# Patient Record
Sex: MALE | Race: OTHER | NOT HISPANIC OR LATINO
[De-identification: names, ages, dates, MRNs, and addresses within clinical notes are randomized per-mention and may not be internally consistent; named-entity substitution may affect disease eponyms.]

---

## 2017-01-12 ENCOUNTER — APPOINTMENT (OUTPATIENT)
Dept: PEDIATRIC NEPHROLOGY | Facility: CLINIC | Age: 22
End: 2017-01-12

## 2017-01-12 ENCOUNTER — OTHER (OUTPATIENT)
Age: 22
End: 2017-01-12

## 2017-01-12 VITALS
HEART RATE: 69 BPM | WEIGHT: 170.09 LBS | DIASTOLIC BLOOD PRESSURE: 53 MMHG | BODY MASS INDEX: 33.39 KG/M2 | HEIGHT: 60 IN | SYSTOLIC BLOOD PRESSURE: 105 MMHG

## 2017-01-12 DIAGNOSIS — Q60.0 RENAL AGENESIS, UNILATERAL: ICD-10-CM

## 2017-01-12 DIAGNOSIS — R80.9 PROTEINURIA, UNSPECIFIED: ICD-10-CM

## 2017-01-12 DIAGNOSIS — R63.1 POLYDIPSIA: ICD-10-CM

## 2017-01-23 PROBLEM — R80.9 PROTEINURIA: Status: ACTIVE | Noted: 2017-01-23

## 2017-01-24 ENCOUNTER — MESSAGE (OUTPATIENT)
Age: 22
End: 2017-01-24

## 2017-01-24 LAB
ANION GAP SERPL CALC-SCNC: 16 MMOL/L
BASOPHILS # BLD AUTO: 0.01 K/UL
BASOPHILS NFR BLD AUTO: 0.1 %
BUN SERPL-MCNC: 13 MG/DL
CALCIUM SERPL-MCNC: 10.1 MG/DL
CHLORIDE SERPL-SCNC: 106 MMOL/L
CO2 SERPL-SCNC: 23 MMOL/L
CREAT SERPL-MCNC: 0.74 MG/DL
CREAT SPEC-SCNC: 227 MG/DL
CREAT/PROT UR: 0.3 RATIO
EOSINOPHIL # BLD AUTO: 0.2 K/UL
EOSINOPHIL NFR BLD AUTO: 2.6 %
GLUCOSE SERPL-MCNC: 96 MG/DL
HCT VFR BLD CALC: 44.3 %
HGB BLD-MCNC: 15.3 G/DL
IMM GRANULOCYTES NFR BLD AUTO: 0.3 %
INR PPP: 3.83 RATIO
LYMPHOCYTES # BLD AUTO: 2.07 K/UL
LYMPHOCYTES NFR BLD AUTO: 27.1 %
MAN DIFF?: NORMAL
MCHC RBC-ENTMCNC: 29 PG
MCHC RBC-ENTMCNC: 34.5 GM/DL
MCV RBC AUTO: 83.9 FL
MONOCYTES # BLD AUTO: 0.74 K/UL
MONOCYTES NFR BLD AUTO: 9.7 %
NEUTROPHILS # BLD AUTO: 4.6 K/UL
NEUTROPHILS NFR BLD AUTO: 60.2 %
OSMOLALITY SERPL: 299 MOS/KG
OSMOLALITY UR: 1085 MOS/KG
PHOSPHATE SERPL-MCNC: 3.2 MG/DL
PLATELET # BLD AUTO: 193 K/UL
POTASSIUM SERPL-SCNC: 4.2 MMOL/L
PROT UR-MCNC: 59 MG/DL
PT BLD: 43.9 SEC
RBC # BLD: 5.28 M/UL
RBC # FLD: 13.5 %
SODIUM ?TM SUB UR QN: 173 MMOL/L
SODIUM SERPL-SCNC: 145 MMOL/L
WBC # FLD AUTO: 7.64 K/UL

## 2017-01-25 ENCOUNTER — MESSAGE (OUTPATIENT)
Age: 22
End: 2017-01-25

## 2017-02-13 ENCOUNTER — APPOINTMENT (OUTPATIENT)
Dept: OTOLARYNGOLOGY | Facility: CLINIC | Age: 22
End: 2017-02-13

## 2017-02-13 ENCOUNTER — RECORD ABSTRACTING (OUTPATIENT)
Age: 22
End: 2017-02-13

## 2017-02-13 DIAGNOSIS — K13.79 OTHER LESIONS OF ORAL MUCOSA: ICD-10-CM

## 2017-02-15 LAB
INR PPP: 3.59 RATIO
PT BLD: 41.1 SEC

## 2017-02-27 ENCOUNTER — APPOINTMENT (OUTPATIENT)
Dept: PHARMACY | Facility: CLINIC | Age: 22
End: 2017-02-27

## 2017-03-01 ENCOUNTER — OUTPATIENT (OUTPATIENT)
Dept: OUTPATIENT SERVICES | Age: 22
LOS: 1 days | Discharge: HOME | End: 2017-03-01

## 2017-03-01 DIAGNOSIS — Q24.9 CONGENITAL MALFORMATION OF HEART, UNSPECIFIED: Chronic | ICD-10-CM

## 2017-03-01 DIAGNOSIS — Z98.89 OTHER SPECIFIED POSTPROCEDURAL STATES: Chronic | ICD-10-CM

## 2017-03-01 DIAGNOSIS — Z95.4 PRESENCE OF OTHER HEART-VALVE REPLACEMENT: Chronic | ICD-10-CM

## 2017-03-02 ENCOUNTER — APPOINTMENT (OUTPATIENT)
Dept: PEDIATRIC CARDIOLOGY | Facility: CLINIC | Age: 22
End: 2017-03-02

## 2017-03-02 VITALS
HEART RATE: 67 BPM | DIASTOLIC BLOOD PRESSURE: 60 MMHG | WEIGHT: 166.67 LBS | OXYGEN SATURATION: 99 % | BODY MASS INDEX: 32.3 KG/M2 | HEIGHT: 60.24 IN | SYSTOLIC BLOOD PRESSURE: 108 MMHG

## 2017-03-13 ENCOUNTER — LABORATORY RESULT (OUTPATIENT)
Age: 22
End: 2017-03-13

## 2017-03-27 ENCOUNTER — LABORATORY RESULT (OUTPATIENT)
Age: 22
End: 2017-03-27

## 2017-03-27 ENCOUNTER — OTHER (OUTPATIENT)
Age: 22
End: 2017-03-27

## 2017-04-24 ENCOUNTER — OTHER (OUTPATIENT)
Age: 22
End: 2017-04-24

## 2017-04-24 ENCOUNTER — APPOINTMENT (OUTPATIENT)
Dept: OTOLARYNGOLOGY | Facility: CLINIC | Age: 22
End: 2017-04-24

## 2017-04-24 VITALS
DIASTOLIC BLOOD PRESSURE: 82 MMHG | WEIGHT: 166 LBS | HEART RATE: 70 BPM | SYSTOLIC BLOOD PRESSURE: 131 MMHG | BODY MASS INDEX: 32.59 KG/M2 | HEIGHT: 60 IN

## 2017-04-26 LAB
INR PPP: 2.94 RATIO
PT BLD: 34 SEC

## 2017-05-22 ENCOUNTER — LABORATORY RESULT (OUTPATIENT)
Age: 22
End: 2017-05-22

## 2017-06-26 ENCOUNTER — LABORATORY RESULT (OUTPATIENT)
Age: 22
End: 2017-06-26

## 2017-07-24 ENCOUNTER — APPOINTMENT (OUTPATIENT)
Dept: OTOLARYNGOLOGY | Facility: CLINIC | Age: 22
End: 2017-07-24

## 2017-07-24 ENCOUNTER — OTHER (OUTPATIENT)
Age: 22
End: 2017-07-24

## 2017-07-24 ENCOUNTER — LABORATORY RESULT (OUTPATIENT)
Age: 22
End: 2017-07-24

## 2017-08-08 ENCOUNTER — OUTPATIENT (OUTPATIENT)
Dept: OUTPATIENT SERVICES | Age: 22
LOS: 1 days | End: 2017-08-08

## 2017-08-08 DIAGNOSIS — Q24.9 CONGENITAL MALFORMATION OF HEART, UNSPECIFIED: Chronic | ICD-10-CM

## 2017-08-08 DIAGNOSIS — Z95.4 PRESENCE OF OTHER HEART-VALVE REPLACEMENT: Chronic | ICD-10-CM

## 2017-08-08 DIAGNOSIS — Z98.89 OTHER SPECIFIED POSTPROCEDURAL STATES: Chronic | ICD-10-CM

## 2017-08-08 DIAGNOSIS — Z01.20 ENCOUNTER FOR DENTAL EXAMINATION AND CLEANING WITHOUT ABNORMAL FINDINGS: ICD-10-CM

## 2017-09-11 ENCOUNTER — LABORATORY RESULT (OUTPATIENT)
Age: 22
End: 2017-09-11

## 2017-09-11 ENCOUNTER — APPOINTMENT (OUTPATIENT)
Dept: PEDIATRIC ALLERGY IMMUNOLOGY | Facility: CLINIC | Age: 22
End: 2017-09-11
Payer: COMMERCIAL

## 2017-09-11 ENCOUNTER — OUTPATIENT (OUTPATIENT)
Dept: OUTPATIENT SERVICES | Age: 22
LOS: 1 days | Discharge: HOME | End: 2017-09-11

## 2017-09-11 VITALS
HEIGHT: 60 IN | SYSTOLIC BLOOD PRESSURE: 115 MMHG | BODY MASS INDEX: 32 KG/M2 | HEART RATE: 65 BPM | WEIGHT: 162.99 LBS | DIASTOLIC BLOOD PRESSURE: 74 MMHG | OXYGEN SATURATION: 98 %

## 2017-09-11 DIAGNOSIS — Z13.228 ENCOUNTER FOR SCREENING FOR OTHER SUSPECTED ENDOCRINE DISORDER: ICD-10-CM

## 2017-09-11 DIAGNOSIS — Q24.9 CONGENITAL MALFORMATION OF HEART, UNSPECIFIED: Chronic | ICD-10-CM

## 2017-09-11 DIAGNOSIS — Z86.19 PERSONAL HISTORY OF OTHER INFECTIOUS AND PARASITIC DISEASES: ICD-10-CM

## 2017-09-11 DIAGNOSIS — Z13.29 ENCOUNTER FOR SCREENING FOR OTHER SUSPECTED ENDOCRINE DISORDER: ICD-10-CM

## 2017-09-11 DIAGNOSIS — J31.0 CHRONIC RHINITIS: ICD-10-CM

## 2017-09-11 DIAGNOSIS — Z95.4 PRESENCE OF OTHER HEART-VALVE REPLACEMENT: Chronic | ICD-10-CM

## 2017-09-11 DIAGNOSIS — Z98.89 OTHER SPECIFIED POSTPROCEDURAL STATES: Chronic | ICD-10-CM

## 2017-09-11 DIAGNOSIS — Z13.0 ENCOUNTER FOR SCREENING FOR OTHER SUSPECTED ENDOCRINE DISORDER: ICD-10-CM

## 2017-09-11 PROCEDURE — 36415 COLL VENOUS BLD VENIPUNCTURE: CPT | Mod: GC

## 2017-09-11 PROCEDURE — 99205 OFFICE O/P NEW HI 60 MIN: CPT | Mod: 25,GC

## 2017-09-11 PROCEDURE — 95004 PERQ TESTS W/ALRGNC XTRCS: CPT | Mod: GC

## 2017-09-12 ENCOUNTER — LABORATORY RESULT (OUTPATIENT)
Age: 22
End: 2017-09-12

## 2017-09-12 ENCOUNTER — APPOINTMENT (OUTPATIENT)
Dept: PEDIATRIC CARDIOLOGY | Facility: CLINIC | Age: 22
End: 2017-09-12
Payer: COMMERCIAL

## 2017-09-12 VITALS
WEIGHT: 163.14 LBS | HEIGHT: 60 IN | BODY MASS INDEX: 32.03 KG/M2 | DIASTOLIC BLOOD PRESSURE: 60 MMHG | SYSTOLIC BLOOD PRESSURE: 112 MMHG | HEART RATE: 87 BPM | OXYGEN SATURATION: 98 %

## 2017-09-12 LAB
ALBUMIN SERPL ELPH-MCNC: 4.8 G/DL
ALP BLD-CCNC: 136 U/L
ALT SERPL-CCNC: 23 U/L
ANION GAP SERPL CALC-SCNC: 21 MMOL/L
AST SERPL-CCNC: 26 U/L
BASOPHILS # BLD AUTO: 0.01 K/UL
BASOPHILS NFR BLD AUTO: 0.1 %
BILIRUB SERPL-MCNC: 0.8 MG/DL
BUN SERPL-MCNC: 20 MG/DL
CALCIUM SERPL-MCNC: 11 MG/DL
CD16+CD56+ CELLS # BLD: 215 /UL
CD16+CD56+ CELLS NFR BLD: 10 %
CD19 CELLS NFR BLD: 388 /UL
CD3 CELLS # BLD: 1612 /UL
CD3 CELLS NFR BLD: 72 %
CD3+CD4+ CELLS # BLD: 715 /UL
CD3+CD4+ CELLS NFR BLD: 31 %
CD3+CD4+ CELLS/CD3+CD8+ CLL SPEC: 0.87 RATIO
CD3+CD8+ CELLS # SPEC: 825 /UL
CD3+CD8+ CELLS NFR BLD: 36 %
CELLS.CD3-CD19+/CELLS IN BLOOD: 18 %
CH50 SERPL-MCNC: 58 U/ML
CHLORIDE SERPL-SCNC: 105 MMOL/L
CO2 SERPL-SCNC: 19 MMOL/L
CREAT SERPL-MCNC: 0.71 MG/DL
EOSINOPHIL # BLD AUTO: 0.19 K/UL
EOSINOPHIL NFR BLD AUTO: 2.5 %
GLUCOSE SERPL-MCNC: 75 MG/DL
HBV SURFACE AB SER QL: NONREACTIVE
HCT VFR BLD CALC: 44.4 %
HGB BLD-MCNC: 15.5 G/DL
IMM GRANULOCYTES NFR BLD AUTO: 0.3 %
LYMPHOCYTES # BLD AUTO: 2.14 K/UL
LYMPHOCYTES NFR BLD AUTO: 28.3 %
MAN DIFF?: NORMAL
MCHC RBC-ENTMCNC: 29.1 PG
MCHC RBC-ENTMCNC: 34.9 GM/DL
MCV RBC AUTO: 83.5 FL
MONOCYTES # BLD AUTO: 0.64 K/UL
MONOCYTES NFR BLD AUTO: 8.5 %
NEUTROPHILS # BLD AUTO: 4.57 K/UL
NEUTROPHILS NFR BLD AUTO: 60.3 %
PLATELET # BLD AUTO: 191 K/UL
POTASSIUM SERPL-SCNC: 4.5 MMOL/L
PROT SERPL-MCNC: 7.5 G/DL
RBC # BLD: 5.32 M/UL
RBC # FLD: 13.5 %
SODIUM SERPL-SCNC: 145 MMOL/L
WBC # FLD AUTO: 7.57 K/UL

## 2017-09-12 PROCEDURE — 99214 OFFICE O/P EST MOD 30 MIN: CPT | Mod: 25

## 2017-09-12 PROCEDURE — 93000 ELECTROCARDIOGRAM COMPLETE: CPT

## 2017-09-13 LAB
A ALTERNATA IGE QN: <0.1 KUA/L
A FUMIGATUS IGE QN: <0.1 KUA/L
AMER BEECH IGE QN: 0
BOXELDER IGE QN: <0.1 KUA/L
C HERBARUM IGE QN: <0.1 KUA/L
C LUNATA IGE QN: <0.1 KUA/L
C TETANI IGG SER-ACNC: 1.53 IU/ML
CAT DANDER IGE QN: <0.1 KUA/L
CMN PIGWEED IGE QN: <0.1 KUA/L
COCKLEBUR IGE QN: <0.1 KUA/L
COCKSFOOT IGE QN: <0.1 KUA/L
COMMON RAGWEED IGE QN: <0.1 KUA/L
COTTONWOOD IGE QN: <0.1 KUA/L
D FARINAE IGE QN: <0.1 KUA/L
D PTERONYSS IGE QN: <0.1 KUA/L
DEPRECATED A ALTERNATA IGE RAST QL: 0
DEPRECATED A FUMIGATUS IGE RAST QL: 0
DEPRECATED A PULLULANS IGE RAST QL: 0
DEPRECATED AMER BEECH IGE RAST QL: <0.1 KUA/L
DEPRECATED BOXELDER IGE RAST QL: 0
DEPRECATED C HERBARUM IGE RAST QL: 0
DEPRECATED C LUNATA IGE RAST QL: 0
DEPRECATED CAT DANDER IGE RAST QL: 0
DEPRECATED COCKLEBUR IGE RAST QL: 0
DEPRECATED COCKSFOOT IGE RAST QL: 0
DEPRECATED COMMON PIGWEED IGE RAST QL: 0
DEPRECATED COMMON RAGWEED IGE RAST QL: 0
DEPRECATED COTTONWOOD IGE RAST QL: 0
DEPRECATED D FARINAE IGE RAST QL: 0
DEPRECATED D PTERONYSS IGE RAST QL: 0
DEPRECATED DOG DANDER IGE RAST QL: 0
DEPRECATED ENGL PLANTAIN IGE RAST QL: 0
DEPRECATED F MONILIFORME IGE RAST QL: 0
DEPRECATED GIANT RAGWEED IGE RAST QL: 0
DEPRECATED GOOSE FEATHER IGE RAST QL: 0
DEPRECATED GOOSEFOOT IGE RAST QL: 0
DEPRECATED KAPPA LC FREE/LAMBDA SER: 1.29 RATIO
DEPRECATED KENT BLUE GRASS IGE RAST QL: 0
DEPRECATED LONDON PLANE IGE RAST QL: 0
DEPRECATED M RACEMOSUS IGE RAST QL: 0
DEPRECATED MUGWORT IGE RAST QL: 0
DEPRECATED P NOTATUM IGE RAST QL: 0
DEPRECATED R NIGRICANS IGE RAST QL: 0
DEPRECATED RED CEDAR IGE RAST QL: 0
DEPRECATED RED TOP GRASS IGE RAST QL: 0
DEPRECATED ROACH IGE RAST QL: 0
DEPRECATED RYE IGE RAST QL: 0
DEPRECATED SALTWORT IGE RAST QL: 0
DEPRECATED SILVER BIRCH IGE RAST QL: 0
DEPRECATED TIMOTHY IGE RAST QL: 0
DEPRECATED WHITE ASH IGE RAST QL: 0
DEPRECATED WHITE HICKORY IGE RAST QL: 0
DEPRECATED WHITE OAK IGE RAST QL: 0
DOG DANDER IGE QN: <0.1 KUA/L
ENGL PLANTAIN IGE QN: <0.1 KUA/L
F MONILIFORME IGE QN: <0.1 KUA/L
GIANT RAGWEED IGE QN: <0.1 KUA/L
GOOSE FEATHER IGE QN: <0.1 KUA/L
GOOSEFOOT IGE QN: <0.1 KUA/L
GRAY ALDER (T2) CLASS: 0
GRAY ALDER (T2) CONC: <0.1 KUA/L
IGA SER QL IEP: 57 MG/DL
IGG SER QL IEP: 887 MG/DL
IGM SER QL IEP: 63 MG/DL
KAPPA LC CSF-MCNC: 1.27 MG/DL
KAPPA LC SERPL-MCNC: 1.64 MG/DL
KENT BLUE GRASS IGE QN: <0.1 KUA/L
LONDON PLANE IGE QN: <0.1 KUA/L
M RACEMOSUS IGE QN: <0.1 KUA/L
MEV IGG FLD QL IA: 45.9 AU/ML
MEV IGG+IGM SER-IMP: POSITIVE
MOLD (AUREOBASIDIUM M12) CONC: <0.1 KUA/L
MUGWORT IGE QN: <0.1 KUA/L
MULBERRY (T70) CLASS: 0
MULBERRY (T70) CONC: <0.1 KUA/L
P NOTATUM IGE QN: <0.1 KUA/L
R NIGRICANS IGE QN: <0.1 KUA/L
RED CEDAR IGE QN: <0.1 KUA/L
RED TOP GRASS IGE QN: <0.1 KUA/L
ROACH IGE QN: <0.1 KUA/L
RYE IGE QN: <0.1 KUA/L
SALTWORT IGE QN: <0.1 KUA/L
SILVER BIRCH IGE QN: <0.1 KUA/L
TIMOTHY IGE QN: <0.1 KUA/L
WHITE ASH IGE QN: <0.1 KUA/L
WHITE ELM IGE QN: 0
WHITE ELM IGE QN: <0.1 KUA/L
WHITE HICKORY IGE QN: <0.1 KUA/L
WHITE OAK IGE QN: <0.1 KUA/L

## 2017-09-18 LAB
COMPLEMENT, ALTERNATE PATHWAY (AH50): 74
DEPRECATED S PNEUM 1 IGG SER-MCNC: 4.8 MCG/ML
DEPRECATED S PNEUM12 AB SER-ACNC: 0.6 MCG/ML
DEPRECATED S PNEUM14 AB SER-ACNC: 1.9 MCG/ML
DEPRECATED S PNEUM17 IGG SER IA-MCNC: 8.2 MCG/ML
DEPRECATED S PNEUM18 IGG SER IA-MCNC: 0.4 MCG/ML
DEPRECATED S PNEUM19 IGG SER-MCNC: 13.7 MCG/ML
DEPRECATED S PNEUM19 IGG SER-MCNC: 4.4 MCG/ML
DEPRECATED S PNEUM2 IGG SER-MCNC: 0.6 MCG/ML
DEPRECATED S PNEUM20 IGG SER-MCNC: 1.6 MCG/ML
DEPRECATED S PNEUM22 IGG SER-MCNC: 10 MCG/ML
DEPRECATED S PNEUM23 AB SER-ACNC: 20.6 MCG/ML
DEPRECATED S PNEUM3 AB SER-ACNC: 6.5 MCG/ML
DEPRECATED S PNEUM34 IGG SER-MCNC: 28.7 MCG/ML
DEPRECATED S PNEUM4 AB SER-ACNC: 1.7 MCG/ML
DEPRECATED S PNEUM5 IGG SER-MCNC: 1.7 MCG/ML
DEPRECATED S PNEUM6 IGG SER-MCNC: 5.3 MCG/ML
DEPRECATED S PNEUM7 IGG SER-ACNC: 5.2 MCG/ML
DEPRECATED S PNEUM8 AB SER-ACNC: 6.4 MCG/ML
DEPRECATED S PNEUM9 AB SER-ACNC: 9.2 MCG/ML
DEPRECATED S PNEUM9 IGG SER-MCNC: 7.1 MCG/ML
HAEM INFLU B AB SER-MCNC: 4.86 MG/L
LPT PW BLD-NRATE: NORMAL
STREPTOCOCCUS PNEUMONIAE SEROTYPE 11A: 0.7 MCG/ML
STREPTOCOCCUS PNEUMONIAE SEROTYPE 15B: 3.3 MCG/ML
STREPTOCOCCUS PNEUMONIAE SEROTYPE 33F: 5.2 MCG/ML

## 2017-09-19 LAB — MANNAN BINDING LECTIN (MBL): 2410 NG/ML

## 2017-10-04 ENCOUNTER — MEDICATION RENEWAL (OUTPATIENT)
Age: 22
End: 2017-10-04

## 2017-10-05 ENCOUNTER — MEDICATION RENEWAL (OUTPATIENT)
Age: 22
End: 2017-10-05

## 2017-10-11 ENCOUNTER — APPOINTMENT (OUTPATIENT)
Dept: PEDIATRIC CARDIOLOGY | Facility: CLINIC | Age: 22
End: 2017-10-11
Payer: COMMERCIAL

## 2017-10-11 VITALS
OXYGEN SATURATION: 98 % | HEART RATE: 64 BPM | WEIGHT: 160.94 LBS | HEIGHT: 60 IN | SYSTOLIC BLOOD PRESSURE: 114 MMHG | BODY MASS INDEX: 31.6 KG/M2 | DIASTOLIC BLOOD PRESSURE: 61 MMHG

## 2017-10-11 PROCEDURE — 93000 ELECTROCARDIOGRAM COMPLETE: CPT | Mod: GC

## 2017-10-11 PROCEDURE — 93303 ECHO TRANSTHORACIC: CPT

## 2017-10-11 PROCEDURE — 93325 DOPPLER ECHO COLOR FLOW MAPG: CPT

## 2017-10-11 PROCEDURE — 93320 DOPPLER ECHO COMPLETE: CPT

## 2017-10-11 PROCEDURE — 99215 OFFICE O/P EST HI 40 MIN: CPT | Mod: 25,GC

## 2017-10-30 ENCOUNTER — APPOINTMENT (OUTPATIENT)
Dept: OTOLARYNGOLOGY | Facility: CLINIC | Age: 22
End: 2017-10-30
Payer: COMMERCIAL

## 2017-10-30 VITALS
HEIGHT: 60 IN | SYSTOLIC BLOOD PRESSURE: 156 MMHG | WEIGHT: 165 LBS | DIASTOLIC BLOOD PRESSURE: 81 MMHG | BODY MASS INDEX: 32.39 KG/M2

## 2017-10-30 DIAGNOSIS — F80.4 SPEECH AND LANGUAGE DEVELOPMENT DELAY DUE TO HEARING LOSS: ICD-10-CM

## 2017-10-30 PROCEDURE — 99213 OFFICE O/P EST LOW 20 MIN: CPT

## 2017-10-30 RX ORDER — AMOXICILLIN AND CLAVULANATE POTASSIUM 875; 125 MG/1; MG/1
875-125 TABLET, COATED ORAL
Refills: 0 | Status: DISCONTINUED | COMMUNITY
End: 2017-10-30

## 2017-11-13 ENCOUNTER — LABORATORY RESULT (OUTPATIENT)
Age: 22
End: 2017-11-13

## 2017-11-13 ENCOUNTER — MEDICATION RENEWAL (OUTPATIENT)
Age: 22
End: 2017-11-13

## 2017-12-18 ENCOUNTER — LABORATORY RESULT (OUTPATIENT)
Age: 22
End: 2017-12-18

## 2018-01-24 ENCOUNTER — APPOINTMENT (OUTPATIENT)
Dept: OTOLARYNGOLOGY | Facility: CLINIC | Age: 23
End: 2018-01-24
Payer: COMMERCIAL

## 2018-01-24 VITALS — DIASTOLIC BLOOD PRESSURE: 70 MMHG | SYSTOLIC BLOOD PRESSURE: 116 MMHG | WEIGHT: 165 LBS | HEART RATE: 65 BPM

## 2018-01-24 PROCEDURE — 99213 OFFICE O/P EST LOW 20 MIN: CPT

## 2018-01-25 LAB
INR PPP: 2.21 RATIO
PT BLD: 25.4 SEC

## 2018-02-12 ENCOUNTER — APPOINTMENT (OUTPATIENT)
Dept: OTOLARYNGOLOGY | Facility: CLINIC | Age: 23
End: 2018-02-12
Payer: COMMERCIAL

## 2018-02-12 VITALS — WEIGHT: 165 LBS

## 2018-02-12 PROCEDURE — 92567 TYMPANOMETRY: CPT

## 2018-02-12 PROCEDURE — 99213 OFFICE O/P EST LOW 20 MIN: CPT | Mod: 25

## 2018-02-12 PROCEDURE — 92582 CONDITIONING PLAY AUDIOMETRY: CPT

## 2018-02-20 ENCOUNTER — LABORATORY RESULT (OUTPATIENT)
Age: 23
End: 2018-02-20

## 2018-02-20 ENCOUNTER — OTHER (OUTPATIENT)
Age: 23
End: 2018-02-20

## 2018-02-26 ENCOUNTER — APPOINTMENT (OUTPATIENT)
Dept: OTOLARYNGOLOGY | Facility: CLINIC | Age: 23
End: 2018-02-26

## 2018-02-26 ENCOUNTER — APPOINTMENT (OUTPATIENT)
Dept: PEDIATRIC ALLERGY IMMUNOLOGY | Facility: CLINIC | Age: 23
End: 2018-02-26

## 2018-03-07 RX ORDER — AMOXICILLIN AND CLAVULANATE POTASSIUM 875; 125 MG/1; MG/1
875-125 TABLET, COATED ORAL
Qty: 20 | Refills: 0 | Status: COMPLETED | COMMUNITY
Start: 2018-01-24 | End: 2018-03-07

## 2018-03-21 ENCOUNTER — OTHER (OUTPATIENT)
Age: 23
End: 2018-03-21

## 2018-03-22 ENCOUNTER — MEDICATION RENEWAL (OUTPATIENT)
Age: 23
End: 2018-03-22

## 2018-03-22 LAB
INR PPP: 2.96 RATIO
PT BLD: 34.2 SEC

## 2018-04-16 ENCOUNTER — OTHER (OUTPATIENT)
Age: 23
End: 2018-04-16

## 2018-04-17 LAB
INR PPP: 2.31 RATIO
PT BLD: 26.6 SEC

## 2018-05-14 ENCOUNTER — APPOINTMENT (OUTPATIENT)
Dept: OTOLARYNGOLOGY | Facility: CLINIC | Age: 23
End: 2018-05-14
Payer: COMMERCIAL

## 2018-05-14 VITALS — WEIGHT: 165 LBS | BODY MASS INDEX: 32.39 KG/M2 | HEIGHT: 60 IN

## 2018-05-14 DIAGNOSIS — H66.92 OTITIS MEDIA, UNSPECIFIED, LEFT EAR: ICD-10-CM

## 2018-05-14 LAB
INR PPP: 3.45 RATIO
PT BLD: 40 SEC

## 2018-05-14 PROCEDURE — 99213 OFFICE O/P EST LOW 20 MIN: CPT

## 2018-05-25 RX ORDER — CIPROFLOXACIN AND DEXAMETHASONE 3; 1 MG/ML; MG/ML
0.3-0.1 SUSPENSION/ DROPS AURICULAR (OTIC)
Qty: 1 | Refills: 4 | Status: COMPLETED | COMMUNITY
Start: 2018-01-24 | End: 2018-05-25

## 2018-06-21 LAB
INR PPP: 2.87 RATIO
PT BLD: 33.1 SEC

## 2018-07-17 ENCOUNTER — OUTPATIENT (OUTPATIENT)
Dept: OUTPATIENT SERVICES | Age: 23
LOS: 1 days | End: 2018-07-17

## 2018-07-17 ENCOUNTER — OTHER (OUTPATIENT)
Age: 23
End: 2018-07-17

## 2018-07-17 DIAGNOSIS — Z95.4 PRESENCE OF OTHER HEART-VALVE REPLACEMENT: Chronic | ICD-10-CM

## 2018-07-17 DIAGNOSIS — Z98.89 OTHER SPECIFIED POSTPROCEDURAL STATES: Chronic | ICD-10-CM

## 2018-07-17 DIAGNOSIS — Q24.9 CONGENITAL MALFORMATION OF HEART, UNSPECIFIED: Chronic | ICD-10-CM

## 2018-07-18 DIAGNOSIS — Z01.20 ENCOUNTER FOR DENTAL EXAMINATION AND CLEANING WITHOUT ABNORMAL FINDINGS: ICD-10-CM

## 2018-07-19 LAB
INR PPP: 2.65 RATIO
PT BLD: 30.6 SEC

## 2018-08-13 ENCOUNTER — APPOINTMENT (OUTPATIENT)
Dept: OTOLARYNGOLOGY | Facility: CLINIC | Age: 23
End: 2018-08-13
Payer: COMMERCIAL

## 2018-08-13 VITALS — WEIGHT: 167 LBS | BODY MASS INDEX: 32.79 KG/M2 | HEIGHT: 60 IN

## 2018-08-13 PROCEDURE — 69210 REMOVE IMPACTED EAR WAX UNI: CPT

## 2018-08-13 PROCEDURE — 99213 OFFICE O/P EST LOW 20 MIN: CPT | Mod: 25

## 2018-08-20 ENCOUNTER — OTHER (OUTPATIENT)
Age: 23
End: 2018-08-20

## 2018-08-24 LAB
INR PPP: 2.16 RATIO
PT BLD: 24.8 SEC

## 2018-09-17 ENCOUNTER — OUTPATIENT (OUTPATIENT)
Dept: OUTPATIENT SERVICES | Age: 23
LOS: 1 days | Discharge: HOME | End: 2018-09-17

## 2018-09-17 DIAGNOSIS — Z95.4 PRESENCE OF OTHER HEART-VALVE REPLACEMENT: Chronic | ICD-10-CM

## 2018-09-17 DIAGNOSIS — Z98.89 OTHER SPECIFIED POSTPROCEDURAL STATES: Chronic | ICD-10-CM

## 2018-09-17 DIAGNOSIS — Q24.9 CONGENITAL MALFORMATION OF HEART, UNSPECIFIED: Chronic | ICD-10-CM

## 2018-09-18 ENCOUNTER — APPOINTMENT (OUTPATIENT)
Dept: PEDIATRIC CARDIOLOGY | Facility: CLINIC | Age: 23
End: 2018-09-18
Payer: COMMERCIAL

## 2018-09-18 ENCOUNTER — OTHER (OUTPATIENT)
Age: 23
End: 2018-09-18

## 2018-09-18 VITALS
WEIGHT: 163.14 LBS | BODY MASS INDEX: 32.03 KG/M2 | HEIGHT: 60 IN | OXYGEN SATURATION: 98 % | SYSTOLIC BLOOD PRESSURE: 112 MMHG | HEART RATE: 70 BPM | DIASTOLIC BLOOD PRESSURE: 62 MMHG

## 2018-09-18 VITALS
BODY MASS INDEX: 32.03 KG/M2 | HEART RATE: 70 BPM | HEIGHT: 60 IN | OXYGEN SATURATION: 98 % | DIASTOLIC BLOOD PRESSURE: 52 MMHG | WEIGHT: 163.14 LBS | SYSTOLIC BLOOD PRESSURE: 112 MMHG

## 2018-09-18 PROCEDURE — 93320 DOPPLER ECHO COMPLETE: CPT

## 2018-09-18 PROCEDURE — 99215 OFFICE O/P EST HI 40 MIN: CPT | Mod: 25

## 2018-09-18 PROCEDURE — 93000 ELECTROCARDIOGRAM COMPLETE: CPT

## 2018-09-18 PROCEDURE — 93325 DOPPLER ECHO COLOR FLOW MAPG: CPT

## 2018-09-18 PROCEDURE — 93303 ECHO TRANSTHORACIC: CPT

## 2018-09-18 PROCEDURE — 99213 OFFICE O/P EST LOW 20 MIN: CPT | Mod: 25

## 2018-09-20 LAB
INR PPP: 2.65 RATIO
PT BLD: 30.5 SEC

## 2018-10-23 LAB
INR PPP: 2.5 RATIO
PT BLD: 28.8 SEC

## 2018-11-05 ENCOUNTER — APPOINTMENT (OUTPATIENT)
Dept: OTOLARYNGOLOGY | Facility: CLINIC | Age: 23
End: 2018-11-05
Payer: COMMERCIAL

## 2018-11-05 VITALS
HEART RATE: 76 BPM | WEIGHT: 163 LBS | SYSTOLIC BLOOD PRESSURE: 120 MMHG | DIASTOLIC BLOOD PRESSURE: 64 MMHG | HEIGHT: 60 IN | BODY MASS INDEX: 32 KG/M2

## 2018-11-05 PROCEDURE — 69210 REMOVE IMPACTED EAR WAX UNI: CPT

## 2018-11-05 PROCEDURE — 99213 OFFICE O/P EST LOW 20 MIN: CPT | Mod: 25

## 2018-11-17 ENCOUNTER — RESULT REVIEW (OUTPATIENT)
Age: 23
End: 2018-11-17

## 2018-11-20 LAB
INR PPP: 2.86
PT BLD: 33.7

## 2018-11-26 ENCOUNTER — OTHER (OUTPATIENT)
Age: 23
End: 2018-11-26

## 2018-12-17 ENCOUNTER — APPOINTMENT (OUTPATIENT)
Dept: OTOLARYNGOLOGY | Facility: CLINIC | Age: 23
End: 2018-12-17
Payer: COMMERCIAL

## 2018-12-17 VITALS
SYSTOLIC BLOOD PRESSURE: 113 MMHG | HEIGHT: 60 IN | HEART RATE: 71 BPM | WEIGHT: 163 LBS | DIASTOLIC BLOOD PRESSURE: 74 MMHG | BODY MASS INDEX: 32 KG/M2

## 2018-12-17 PROCEDURE — 99213 OFFICE O/P EST LOW 20 MIN: CPT | Mod: 25

## 2018-12-17 PROCEDURE — 69210 REMOVE IMPACTED EAR WAX UNI: CPT

## 2018-12-17 RX ORDER — AMOXICILLIN AND CLAVULANATE POTASSIUM 875; 125 MG/1; MG/1
875-125 TABLET, COATED ORAL
Qty: 20 | Refills: 0 | Status: DISCONTINUED | COMMUNITY
Start: 2018-11-05 | End: 2018-12-17

## 2018-12-20 LAB
INR PPP: 2.46 RATIO
PT BLD: 28.3 SEC

## 2019-01-17 ENCOUNTER — RX RENEWAL (OUTPATIENT)
Age: 24
End: 2019-01-17

## 2019-01-17 LAB
INR PPP: 2.57 RATIO
PT BLD: 29.6 SEC

## 2019-02-20 ENCOUNTER — RESULT REVIEW (OUTPATIENT)
Age: 24
End: 2019-02-20

## 2019-02-20 LAB
INR PPP: 2.98
PT BLD: 35.4

## 2019-03-18 ENCOUNTER — APPOINTMENT (OUTPATIENT)
Dept: OTOLARYNGOLOGY | Facility: CLINIC | Age: 24
End: 2019-03-18
Payer: COMMERCIAL

## 2019-03-18 ENCOUNTER — RECORD ABSTRACTING (OUTPATIENT)
Age: 24
End: 2019-03-18

## 2019-03-18 VITALS — WEIGHT: 163 LBS | BODY MASS INDEX: 32 KG/M2 | HEIGHT: 60 IN

## 2019-03-18 LAB
INR PPP: 3.08 RATIO
PT BLD: 36.4 SEC

## 2019-03-18 PROCEDURE — 92567 TYMPANOMETRY: CPT

## 2019-03-18 PROCEDURE — 99213 OFFICE O/P EST LOW 20 MIN: CPT | Mod: 25

## 2019-03-18 PROCEDURE — 92557 COMPREHENSIVE HEARING TEST: CPT

## 2019-03-18 NOTE — HISTORY OF PRESENT ILLNESS
[de-identified] : 23 yr old returns for routine cleaning- wears bilateral HAs- no recent ear drainage or infection since last visit.

## 2019-03-18 NOTE — REASON FOR VISIT
[Subsequent Evaluation] : a subsequent evaluation for [Ear Drainage] : ear drainage [Hearing Loss] : hearing loss [FreeTextEntry2] : routine cleaning

## 2019-03-18 NOTE — PHYSICAL EXAM
[Normal] : mucosa is normal [Midline] : trachea located in midline position [FreeTextEntry1] : poor speech [de-identified] : cerumen removed AU - AS thick TM retracted otherwise ROGERS - AD -dry perf (central) [de-identified] : higharched

## 2019-03-22 ENCOUNTER — RX RENEWAL (OUTPATIENT)
Age: 24
End: 2019-03-22

## 2019-03-27 ENCOUNTER — RX RENEWAL (OUTPATIENT)
Age: 24
End: 2019-03-27

## 2019-03-27 ENCOUNTER — MEDICATION RENEWAL (OUTPATIENT)
Age: 24
End: 2019-03-27

## 2019-04-22 ENCOUNTER — LABORATORY RESULT (OUTPATIENT)
Age: 24
End: 2019-04-22

## 2019-05-22 ENCOUNTER — OTHER (OUTPATIENT)
Age: 24
End: 2019-05-22

## 2019-05-22 LAB
INR PPP: 2.44 RATIO
PT BLD: 28.6 SEC

## 2019-07-02 ENCOUNTER — OTHER (OUTPATIENT)
Age: 24
End: 2019-07-02

## 2019-07-03 LAB
INR PPP: 3.74
PT BLD: 44

## 2019-07-10 ENCOUNTER — OTHER (OUTPATIENT)
Age: 24
End: 2019-07-10

## 2019-08-12 ENCOUNTER — OTHER (OUTPATIENT)
Age: 24
End: 2019-08-12

## 2019-08-14 LAB
INR PPP: 3.74 RATIO
PT BLD: 44.4 SEC

## 2019-08-26 ENCOUNTER — OTHER (OUTPATIENT)
Age: 24
End: 2019-08-26

## 2019-08-26 LAB
INR PPP: 3.06 RATIO
PT BLD: 36.1 SEC

## 2019-09-18 ENCOUNTER — OUTPATIENT (OUTPATIENT)
Dept: OUTPATIENT SERVICES | Age: 24
LOS: 1 days | Discharge: HOME | End: 2019-09-18

## 2019-09-18 DIAGNOSIS — Z98.89 OTHER SPECIFIED POSTPROCEDURAL STATES: Chronic | ICD-10-CM

## 2019-09-18 DIAGNOSIS — Q24.9 CONGENITAL MALFORMATION OF HEART, UNSPECIFIED: Chronic | ICD-10-CM

## 2019-09-18 DIAGNOSIS — Z95.4 PRESENCE OF OTHER HEART-VALVE REPLACEMENT: Chronic | ICD-10-CM

## 2019-09-19 ENCOUNTER — APPOINTMENT (OUTPATIENT)
Dept: PEDIATRIC CARDIOLOGY | Facility: CLINIC | Age: 24
End: 2019-09-19
Payer: COMMERCIAL

## 2019-09-19 VITALS
BODY MASS INDEX: 30.3 KG/M2 | HEIGHT: 60 IN | DIASTOLIC BLOOD PRESSURE: 66 MMHG | WEIGHT: 154.32 LBS | OXYGEN SATURATION: 98 % | SYSTOLIC BLOOD PRESSURE: 100 MMHG | HEART RATE: 74 BPM

## 2019-09-19 VITALS
DIASTOLIC BLOOD PRESSURE: 66 MMHG | OXYGEN SATURATION: 98 % | WEIGHT: 154.32 LBS | HEART RATE: 74 BPM | BODY MASS INDEX: 30.3 KG/M2 | SYSTOLIC BLOOD PRESSURE: 100 MMHG | HEIGHT: 60 IN

## 2019-09-19 DIAGNOSIS — I48.92 UNSPECIFIED ATRIAL FLUTTER: ICD-10-CM

## 2019-09-19 DIAGNOSIS — Q25.1 COARCTATION OF AORTA: ICD-10-CM

## 2019-09-19 LAB
INR PPP: 2.6 RATIO
PT BLD: 30.3 SEC

## 2019-09-19 PROCEDURE — 99215 OFFICE O/P EST HI 40 MIN: CPT

## 2019-09-19 PROCEDURE — 93000 ELECTROCARDIOGRAM COMPLETE: CPT

## 2019-09-19 PROCEDURE — 93325 DOPPLER ECHO COLOR FLOW MAPG: CPT

## 2019-09-19 PROCEDURE — 99214 OFFICE O/P EST MOD 30 MIN: CPT | Mod: 25

## 2019-09-19 PROCEDURE — 93303 ECHO TRANSTHORACIC: CPT

## 2019-09-19 PROCEDURE — 93320 DOPPLER ECHO COMPLETE: CPT

## 2019-09-19 NOTE — CONSULT LETTER
[Today's Date] : [unfilled] [Name] : Name: [unfilled] [] : : ~~ [Today's Date:] : [unfilled] [Dear  ___:] : Dear Dr. [unfilled]: [Consult] : I had the pleasure of evaluating your patient, [unfilled]. My full evaluation follows. [Consult - Single Provider] : Thank you very much for allowing me to participate in the care of this patient. If you have any questions, please do not hesitate to contact me. [Sincerely,] : Sincerely, [DrMike  ___] : Dr. DON [FreeTextEntry4] : Domenic Maria MD [FreeTextEntry5] : [address] [FreeTextEntry6] : [place] [address] [de-identified] :  \par \par Husam Maldonado MD, FACC, FHRS, FAAP\par Associate Chief, Pediatric Cardiology\par , Pediatric Cardiology Fellowship\par , Pediatric Cardiac Electrophysiology\par [facility]\par [facility]\par Professor of Pediatrics\par [facility] [employer]\par \par

## 2019-09-19 NOTE — HISTORY OF PRESENT ILLNESS
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 He was last seen on 9/18/18. SInce then he has been well without signs or symptoms of recurrent atrial flutter as he is taking his atenolol and Coumadin.\par \par His last recurrence was on June 16th, 2016. At the time Kirk was admitted to [facility] where he was rate controlled with esmolol until he could be cardioverted successfully the next day. At the time his atenolol was doubled and he has been well without recurrent palpitations, chest pain, exertional dyspnea, sob, dizziness, fatigue or syncope since his discharge. He has otherwise been well and has had no further ED visits or hospitalizations since.

## 2019-09-19 NOTE — DISCUSSION/SUMMARY
[PE + No Restrictions] : [unfilled] may participate in the entire physical education program without restriction, including all varsity competitive sports. 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 He remains in a sinus rhythm at this time. He remains on one 50 mg atenolol tablet twice a day.  Coumadin also needs to be continued. I would like to see Kirk again in 12 months or prior if new symptoms or concerns arise.  Otherwise, he can follow up with Dr Davis. [Needs SBE Prophylaxis] : [unfilled] does not need bacterial endocarditis prophylaxis

## 2019-09-19 NOTE — REASON FOR VISIT
[Follow-Up] : a follow-up visit for [Atrial Flutter] : atrial flutter [Patient] : patient [Mother] : mother

## 2019-09-19 NOTE — CARDIOLOGY SUMMARY
[Today's Date] : [unfilled] [FreeTextEntry1] : NSR @ 72 bpm with RBBB QTc: 446 ms.  non-specific T wave changes.

## 2019-09-19 NOTE — REASON FOR VISIT
[Follow-Up] : a follow-up visit for [S/P Cardiac Surgery] : status post cardiac surgery [Coarctation Of The Aorta] : coarctation of the aorta [Mother] : mother [Patient] : patient

## 2019-09-30 ENCOUNTER — APPOINTMENT (OUTPATIENT)
Dept: OTOLARYNGOLOGY | Facility: CLINIC | Age: 24
End: 2019-09-30
Payer: COMMERCIAL

## 2019-09-30 ENCOUNTER — OTHER (OUTPATIENT)
Age: 24
End: 2019-09-30

## 2019-09-30 VITALS
BODY MASS INDEX: 30.43 KG/M2 | SYSTOLIC BLOOD PRESSURE: 129 MMHG | DIASTOLIC BLOOD PRESSURE: 72 MMHG | WEIGHT: 155 LBS | HEIGHT: 60 IN | HEART RATE: 65 BPM

## 2019-09-30 DIAGNOSIS — H61.20 IMPACTED CERUMEN, UNSPECIFIED EAR: ICD-10-CM

## 2019-09-30 PROCEDURE — 99213 OFFICE O/P EST LOW 20 MIN: CPT | Mod: 25

## 2019-09-30 PROCEDURE — 69210 REMOVE IMPACTED EAR WAX UNI: CPT

## 2019-09-30 RX ORDER — TOBRAMYCIN AND DEXAMETHASONE 3; 1 MG/ML; MG/ML
0.3-0.1 SUSPENSION/ DROPS OPHTHALMIC
Qty: 1 | Refills: 0 | Status: COMPLETED | COMMUNITY
Start: 2018-11-05 | End: 2019-09-30

## 2019-09-30 NOTE — PHYSICAL EXAM
[Normal] : mucosa is normal [Midline] : trachea located in midline position [FreeTextEntry1] : poor speech [de-identified] : large amount L>R - cerumen removed AU - AS thick TM retracted otherwise ROGERS - AD -dry perf (central) [de-identified] : higharched

## 2019-09-30 NOTE — REASON FOR VISIT
[Subsequent Evaluation] : a subsequent evaluation for [Parent] : parent [FreeTextEntry2] : follow up bilateral hearing loss, bilaterally clogged ears

## 2019-10-03 NOTE — CARDIOLOGY SUMMARY
[Today's Date] : [unfilled] [de-identified] : 9/19/19 [FreeTextEntry1] : NSR, vent rate 72 bpm, left axis deviation, normal intervals.  [de-identified] : 9/19/19 [FreeTextEntry2] : We reviewed today's echo. Unchanged mean gradient across mitral valve of 4-5mmHg and no regurgitation. There is no residual coarctation. Normal systolic Doppler profile in the descending aorta at the level of the diaphragm. Normal biventricular function.\par

## 2019-10-03 NOTE — DISCUSSION/SUMMARY
[Needs SBE Prophylaxis] : [unfilled]  needs bacterial endocarditis prophylaxis. SBE prophylaxis is indicated for dental and invasive ENT procedures. (Circulation. 2007; 116: 0099-6609) [PE + No Varsity Sports or Strenuous Activity] : [unfilled] may participate in the physical education program, WITH RESTRICTION from all varsity sports and from excessively stressful activities such as rope climbing, weight lifting, sustained running (i.e. laps) and fitness testing. Must be allowed to rest when tired. 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 Michael prosthesis and history of atrial flutter. He has been doing well and asymptomatic from a cardiovascular perspective with no additional episode of atrial flutter since increasing his atenolol in June 2016. His mean gradient across the St. Michael valve has been stable at 4-5 mmHg. He is currently in normal sinus rhythm. We will continue his Atenolol at the current dose. His INR has been therapeutic land we will obtain an INR today and emphasized the significance of monthly INR checks. At this point we would like to transition his care to Dr Staton in our [facility] clinic due to his added expertise in arrhythmia. He will return for follow up in one year unless new concerns arise. Family was also introduced to Enid Rubi NP during this visit with us. There are no changes in medication management. \par \par \par

## 2019-10-03 NOTE — REVIEW OF SYSTEMS
[Fever] : no fever [Feeling Poorly] : not feeling poorly (malaise) [Wgt Loss (___ Lbs)] : no recent weight loss [Pallor] : not pale [Redness] : no redness [Eye Discharge] : no eye discharge [Change in Vision] : no change in vision [Nasal Stuffiness] : no nasal congestion [Sore Throat] : no sore throat [Loss Of Hearing] : no hearing loss [Earache] : no earache [Cyanosis] : no cyanosis [Edema] : no edema [Diaphoresis] : not diaphoretic [Chest Pain] : no chest pain or discomfort [Exercise Intolerance] : no persistence of exercise intolerance [Palpitations] : no palpitations [Orthopnea] : no orthopnea [Tachypnea] : not tachypneic [Fast HR] : no tachycardia [Wheezing] : no wheezing [Cough] : no cough [Shortness Of Breath] : not expressed as feeling short of breath [Diarrhea] : no diarrhea [Vomiting] : no vomiting [Abdominal Pain] : no abdominal pain [Decrease In Appetite] : appetite not decreased [Fainting (Syncope)] : no fainting [Seizure] : no seizures [Headache] : no headache [Dizziness] : no dizziness [Limping] : no limping [Joint Pains] : no arthralgias [Joint Swelling] : no joint swelling [Rash] : no rash [Wound problems] : no wound problems [Easy Bruising] : no tendency for easy bruising [Easy Bleeding] : no ~M tendency for easy bleeding [Swollen Glands] : no lymphadenopathy [Sleep Disturbances] : ~T no sleep disturbances [Nosebleeds] : no epistaxis [Hyperactive] : no hyperactive behavior [Anxiety] : no anxiety [Depression] : no depression [Failure To Thrive] : no failure to thrive [Short Stature] : short stature was not noted [Jitteriness] : no jitteriness [Heat/Cold Intolerance] : no temperature intolerance [Dec Urine Output] : no oliguria

## 2019-10-03 NOTE — PHYSICAL EXAM
[General Appearance - Alert] : alert [General Appearance - In No Acute Distress] : in no acute distress [General Appearance - Well Nourished] : well nourished [General Appearance - Well Developed] : well developed [General Appearance - Well-Appearing] : well appearing [Attitude Uncooperative] : cooperative [Sclera] : the conjunctiva were normal [Outer Ear] : the ears and nose were normal in appearance [Examination Of The Oral Cavity] : mucous membranes were moist and pink [Respiration, Rhythm And Depth] : normal respiratory rhythm and effort [Auscultation Breath Sounds / Voice Sounds] : breath sounds clear to auscultation bilaterally [No Cough] : no cough [Chest Surgical / Traumatic Scar] : chest incision well healed [Chest Palpation Tender Sternum] : no chest wall tenderness [No Sternal Instability] : no sternal instability [Apical Impulse] : quiet precordium with normal apical impulse [Heart Rate And Rhythm] : normal heart rate and rhythm [No Murmur] : no murmurs  [Heart Sounds Gallop] : no gallops [Heart Sounds Pericardial Friction Rub] : no pericardial rub [Arterial Pulses] : normal upper and lower extremity pulses with no pulse delay [Edema] : no edema [Capillary Refill Test] : normal capillary refill [Normal] : normal  [Bowel Sounds] : normal bowel sounds [Abdomen Soft] : soft [Nondistended] : nondistended [Abdomen Tenderness] : non-tender [Musculoskeletal Exam: Normal Movement Of All Extremities] : normal movements of all extremities [Musculoskeletal - Swelling] : no joint swelling seen [Musculoskeletal - Tenderness] : no joint tenderness was elicited [Nail Clubbing] : no clubbing  or cyanosis of the fingers [Motor Tone] : muscle strength and tone were normal [] : no rash [Skin Lesions] : no lesions [Skin Turgor] : normal turgor [Demonstrated Behavior - Infant Nonreactive To Parents] : interactive [Mood] : mood and affect were appropriate for age [FreeTextEntry1] : mechanical S1. distant heart sounds.

## 2019-10-03 NOTE — HISTORY OF PRESENT ILLNESS
[FreeTextEntry1] : It was our pleasure to see Kirk Andrews in our pediatric cardiology offices at [facility] on sept 19th, 2019. \par \par As you know, Kirk is a 24 yr old male with Kabuki syndrome and multiple congenital anomalies including anal atresia, cleft palate, unilateral multicystic dysplastic left kidney, and bilateral hip dislocation. He is s/p coarctation repair and s/p 25 mm st Michael MVR.  Also has hx of A Flutter without recurrences since 2016 on meds. He is stable from a cardiac standpoint and comes in today for f/u. Kirk has been doing well without any symptoms related to cardiovascular system. Mom reports that Kirk has had no complaints, including episodes of palpitations, syncope, chest pain, exercise intolerance.  Today, he was also seen in our EP clinic as well and there were no concerns.\par  Below is the detailed hx:\par He was diagnosed with bicuspid aortic valve and coarctation of aorta during an evaluation for a heart murmur and underwent successful transcatheter balloon angioplasty at eight months of age. At about age 11, he started having symptoms of dyspnea and shortness of breath. Subsequently, in February 2006 he underwent cardiac catheterization and was found to have supra systemic right ventricular pressure secondary to severe mitral valve stenosis. A sedated echocardiogram performed after catheterization revealed a parachute mitral valve. On February 2006, he underwent mitral valve replacement with 25 mm St.Michael mechanical valve.\par \par He has history of atrial flutter which was difficult to control with antiarrhythmic medications and underwent intracardiac electrophysiologic study with successful ablation on 7/17/2006. He had a breakthrough episode in March 2008 and April 2013, and most recently in June 2016, were he was again successfully cardioverted. During this past episode his Atenolol dose was increased to 50 mg BID and he has had no recurrent episodes. His INR has remained therapeutic during the last 1 year on 3.5 mg alternated with 3 mg daily dosing.

## 2019-10-03 NOTE — CONSULT LETTER
[Name] : Name: [unfilled] [] : : ~~ [Dear  ___:] : Dear Dr. [unfilled]: [Consult] : I had the pleasure of evaluating your patient, [unfilled]. My full evaluation follows. [Consult - Single Provider] : Thank you very much for allowing me to participate in the care of this patient. If you have any questions, please do not hesitate to contact me. [Sincerely,] : Sincerely, [DrMike  ___] : Dr. DON [FreeTextEntry9] : 9/18/18 [FreeTextEntry4] : Domenic Maria MD [FreeTextEntry6] : [place] [address] [FreeTextEntry5] : [address] [FreeTextEntry1] : 9/18/18 [de-identified] : Victor M Davis MD\par Director, Pediatric catheterization Lab\par [facility]\par , [facility]\par Telephone: [phone]\par [phone]\par

## 2019-10-24 LAB
INR PPP: 2.55 RATIO
PT BLD: 29.9 SEC

## 2019-11-25 ENCOUNTER — RESULT REVIEW (OUTPATIENT)
Age: 24
End: 2019-11-25

## 2019-11-25 LAB
INR PPP: 2.82
PT BLD: 33.2

## 2019-11-29 ENCOUNTER — OTHER (OUTPATIENT)
Age: 24
End: 2019-11-29

## 2019-12-02 LAB
INR PPP: 2.62 RATIO
PT BLD: 31.1 SEC

## 2020-01-01 ENCOUNTER — APPOINTMENT (OUTPATIENT)
Dept: PEDIATRIC CARDIOLOGY | Facility: CLINIC | Age: 25
End: 2020-01-01
Payer: COMMERCIAL

## 2020-01-01 ENCOUNTER — RESULT CHARGE (OUTPATIENT)
Age: 25
End: 2020-01-01

## 2020-01-01 ENCOUNTER — APPOINTMENT (OUTPATIENT)
Dept: PEDIATRIC CARDIOLOGY | Facility: CLINIC | Age: 25
End: 2020-01-01

## 2020-01-01 ENCOUNTER — APPOINTMENT (OUTPATIENT)
Dept: OTOLARYNGOLOGY | Facility: CLINIC | Age: 25
End: 2020-01-01
Payer: COMMERCIAL

## 2020-01-01 ENCOUNTER — LABORATORY RESULT (OUTPATIENT)
Age: 25
End: 2020-01-01

## 2020-01-01 VITALS
HEIGHT: 60 IN | DIASTOLIC BLOOD PRESSURE: 79 MMHG | HEART RATE: 76 BPM | SYSTOLIC BLOOD PRESSURE: 119 MMHG | BODY MASS INDEX: 30.43 KG/M2 | WEIGHT: 155 LBS

## 2020-01-01 VITALS
WEIGHT: 155 LBS | BODY MASS INDEX: 30.43 KG/M2 | DIASTOLIC BLOOD PRESSURE: 68 MMHG | HEIGHT: 60 IN | HEART RATE: 75 BPM | SYSTOLIC BLOOD PRESSURE: 102 MMHG

## 2020-01-01 VITALS
HEIGHT: 60.63 IN | HEART RATE: 74 BPM | BODY MASS INDEX: 30.44 KG/M2 | DIASTOLIC BLOOD PRESSURE: 70 MMHG | OXYGEN SATURATION: 97 % | SYSTOLIC BLOOD PRESSURE: 116 MMHG | WEIGHT: 159.17 LBS | RESPIRATION RATE: 12 BRPM

## 2020-01-01 DIAGNOSIS — H66.91 OTITIS MEDIA, UNSPECIFIED, RIGHT EAR: ICD-10-CM

## 2020-01-01 DIAGNOSIS — Z95.2 PRESENCE OF PROSTHETIC HEART VALVE: ICD-10-CM

## 2020-01-01 DIAGNOSIS — H72.01 CENTRAL PERFORATION OF TYMPANIC MEMBRANE, RIGHT EAR: ICD-10-CM

## 2020-01-01 DIAGNOSIS — Q89.8 OTHER SPECIFIED CONGENITAL MALFORMATIONS: ICD-10-CM

## 2020-01-01 DIAGNOSIS — Z87.74 PERSONAL HISTORY OF (CORRECTED) CONGENITAL MALFORMATIONS OF HEART AND CIRCULATORY SYSTEM: ICD-10-CM

## 2020-01-01 DIAGNOSIS — Z79.01 LONG TERM (CURRENT) USE OF ANTICOAGULANTS: ICD-10-CM

## 2020-01-01 DIAGNOSIS — H70.10 CHRONIC MASTOIDITIS, UNSPECIFIED EAR: ICD-10-CM

## 2020-01-01 DIAGNOSIS — H61.23 IMPACTED CERUMEN, BILATERAL: ICD-10-CM

## 2020-01-01 DIAGNOSIS — H90.2 CONDUCTIVE HEARING LOSS, UNSPECIFIED: ICD-10-CM

## 2020-01-01 LAB
INR PPP: 2.03 RATIO
INR PPP: 2.06 RATIO
INR PPP: 2.27 RATIO
INR PPP: 2.29 RATIO
INR PPP: 2.4
INR PPP: 2.54 RATIO
INR PPP: 2.57 RATIO
INR PPP: 2.8 RATIO
INR PPP: 3.12 RATIO
INR PPP: 3.59 RATIO
INR PPP: 4.31 RATIO
PT BLD: 23.1
PT BLD: 23.5 SEC
PT BLD: 23.7 SEC
PT BLD: 26.1 SEC
PT BLD: 26.2 SEC
PT BLD: 29.2 SEC
PT BLD: 29.6 SEC
PT BLD: 31.7 SEC
PT BLD: 36.5 SEC
PT BLD: 40.2 SEC
PT BLD: 47.4 SEC

## 2020-01-01 PROCEDURE — 92557 COMPREHENSIVE HEARING TEST: CPT

## 2020-01-01 PROCEDURE — 93325 DOPPLER ECHO COLOR FLOW MAPG: CPT

## 2020-01-01 PROCEDURE — 99213 OFFICE O/P EST LOW 20 MIN: CPT | Mod: 25

## 2020-01-01 PROCEDURE — 92567 TYMPANOMETRY: CPT

## 2020-01-01 PROCEDURE — 99205 OFFICE O/P NEW HI 60 MIN: CPT | Mod: 25

## 2020-01-01 PROCEDURE — 93320 DOPPLER ECHO COMPLETE: CPT

## 2020-01-01 PROCEDURE — 93303 ECHO TRANSTHORACIC: CPT

## 2020-01-01 PROCEDURE — 93000 ELECTROCARDIOGRAM COMPLETE: CPT

## 2020-01-01 PROCEDURE — 69210 REMOVE IMPACTED EAR WAX UNI: CPT

## 2020-01-01 RX ORDER — CIPROFLOXACIN AND DEXAMETHASONE 3; 1 MG/ML; MG/ML
0.3-0.1 SUSPENSION/ DROPS AURICULAR (OTIC) TWICE DAILY
Qty: 1 | Refills: 1 | Status: ACTIVE | COMMUNITY
Start: 2020-01-01 | End: 1900-01-01

## 2020-01-01 RX ORDER — FLUTICASONE PROPIONATE 50 UG/1
50 SPRAY, METERED NASAL DAILY
Qty: 1 | Refills: 3 | Status: DISCONTINUED | COMMUNITY
Start: 2017-09-11 | End: 2020-01-01

## 2020-01-01 RX ORDER — WARFARIN 1 MG/1
1 TABLET ORAL
Qty: 30 | Refills: 5 | Status: ACTIVE | COMMUNITY
Start: 2020-01-01 | End: 1900-01-01

## 2020-01-01 RX ORDER — ATENOLOL 50 MG/1
50 TABLET ORAL TWICE DAILY
Qty: 60 | Refills: 5 | Status: ACTIVE | COMMUNITY
Start: 2018-09-18 | End: 1900-01-01

## 2020-01-17 ENCOUNTER — EMERGENCY (EMERGENCY)
Age: 25
LOS: 1 days | Discharge: HOME | End: 2020-01-17
Attending: PEDIATRICS | Admitting: PEDIATRICS
Payer: COMMERCIAL

## 2020-01-17 VITALS
HEART RATE: 63 BPM | DIASTOLIC BLOOD PRESSURE: 81 MMHG | WEIGHT: 80.25 LBS | RESPIRATION RATE: 18 BRPM | TEMPERATURE: 98 F | OXYGEN SATURATION: 98 % | SYSTOLIC BLOOD PRESSURE: 118 MMHG

## 2020-01-17 VITALS
TEMPERATURE: 98 F | RESPIRATION RATE: 18 BRPM | HEART RATE: 64 BPM | SYSTOLIC BLOOD PRESSURE: 124 MMHG | DIASTOLIC BLOOD PRESSURE: 83 MMHG | OXYGEN SATURATION: 100 %

## 2020-01-17 DIAGNOSIS — Z98.89 OTHER SPECIFIED POSTPROCEDURAL STATES: Chronic | ICD-10-CM

## 2020-01-17 DIAGNOSIS — Z95.4 PRESENCE OF OTHER HEART-VALVE REPLACEMENT: Chronic | ICD-10-CM

## 2020-01-17 DIAGNOSIS — Q24.9 CONGENITAL MALFORMATION OF HEART, UNSPECIFIED: Chronic | ICD-10-CM

## 2020-01-17 PROCEDURE — 99282 EMERGENCY DEPT VISIT SF MDM: CPT

## 2020-01-17 RX ORDER — LIDOCAINE HYDROCHLORIDE AND EPINEPHRINE 10; 10 MG/ML; UG/ML
10 INJECTION, SOLUTION INFILTRATION; PERINEURAL ONCE
Refills: 0 | Status: DISCONTINUED | OUTPATIENT
Start: 2020-01-17 | End: 2020-01-01

## 2020-01-17 RX ORDER — CEPHALEXIN 500 MG
1 CAPSULE ORAL
Qty: 20 | Refills: 0
Start: 2020-01-17 | End: 2020-01-01

## 2020-01-17 RX ORDER — LIDOCAINE 4 G/100G
1 CREAM TOPICAL ONCE
Refills: 0 | Status: COMPLETED | OUTPATIENT
Start: 2020-01-17 | End: 2020-01-17

## 2020-01-17 RX ADMIN — LIDOCAINE 1 APPLICATION(S): 4 CREAM TOPICAL at 13:52

## 2020-01-17 NOTE — ED PROVIDER NOTE - CLINICAL SUMMARY MEDICAL DECISION MAKING FREE TEXT BOX
Pt is 25 y/o M presents c/o abscess, will consult heme due to warfarin, likely needle aspiration for symptomatic relief and f/u outpt with surgery

## 2020-01-17 NOTE — ED PROVIDER NOTE - PROGRESS NOTE DETAILS
Abscess spontaneously unroofed, drained until only blood returned.  Area flat and without inflamation.  Pt feeling better, will send keflex rx and f/u with pcp

## 2020-01-17 NOTE — ED PROVIDER NOTE - NSFOLLOWUPINSTRUCTIONS_ED_ALL_ED_FT
Please follow up with Primary Care provider    If there are signs of infection, fever, spreading redness, warmth, continued bleeding, please seek evaluation.

## 2020-01-17 NOTE — ED PROVIDER NOTE - PATIENT PORTAL LINK FT
You can access the FollowMyHealth Patient Portal offered by [facility] by registering at the following website: [email or web address]. By joining [employer]’s FollowMyHealth portal, you will also be able to view your health information using other applications (apps) compatible with our system.

## 2020-01-17 NOTE — ED PEDIATRIC TRIAGE NOTE - CHIEF COMPLAINT QUOTE
C/o abscess, painful to touch, on back.  Denies fever.  PMH Kabuki syndrome.  Extensive surgical history.  Followed at [facility].

## 2020-01-17 NOTE — ED PEDIATRIC NURSE NOTE - CHIEF COMPLAINT QUOTE
C/o abscess, painful to touch, on back.  Denies fever.  PMH Kabuki syndrome.  Extensive surgical history.  Follow[place] [facility].

## 2020-01-17 NOTE — ED PROVIDER NOTE - ATTENDING CONTRIBUTION TO CARE
PEM ATTENDING ADDENDUM  I personally performed a history and physical examination, and discussed the management with the resident/fellow.  The past medical and surgical history, review of systems, family history, social history, current medications, allergies, and immunization status were discussed with the trainee, and I confirmed pertinent portions with the patient and/or famil.  I made modifications above as I felt appropriate; I concur with the history as documented above unless otherwise noted below. My physical exam findings are listed below, which may differ from that documented by the trainee.  I was present for and directly supervised any procedure(s) as documented above.  I personally reviewed the labwork and imaging obtained.  I reviewed the trainee's assessment and plan and made modifications as I felt appropriate.  I agree with the assessment and plan as documented above, unless noted below.    Jaiden HENRY

## 2020-01-17 NOTE — ED PROVIDER NOTE - PMH
Anal atresia    Anticoagulation adequate with anticoagulant therapy    Bicuspid aortic valve    Bilateral hip dislocation    Cleft palate    Kabuki Syndrome    Kidney stones    Mitral valve stenosis    Otitis media  ruptured ear drum  Solitary kidney    Velopharyngeal incompetence

## 2020-01-17 NOTE — ED PEDIATRIC NURSE NOTE - INTERVENTIONS DEFINITIONS
Gunlock to call system/Non-slip footwear when patient is off stretcher/Call bell, personal items and telephone within reach/Instruct patient to call for assistance

## 2020-01-17 NOTE — ED CLERICAL - NS ED CLERK NOTE PRE-ARRIVAL INFORMATION; ADDITIONAL PRE-ARRIVAL INFORMATION
24yo M with Kabuki synd, bicuspid AV, mitral stenosis, b/l hip dysplasia, L unilat dysplastic kidney, coming in with back abscess.  Call in taken by MARIA DOLORES

## 2020-01-17 NOTE — ED PROVIDER NOTE - NS ED ROS FT
Constitutional: denies chills, fever  HENT: denies congestion, ear pain, headaches, sore throat, stridor  Eyes: denies blurred vision, double vision, eye pain  Cardiovascular: denies chest pain, leg swelling, palpitations  Respiratory: denies cough, shortness of breath, sputum production, wheezing  Gastrointestinal: denies abdominal pain, constipation, diarrhea, nausea, vomiting  Genitourinary: denies dysuria, flank pain, hematuria  Musculoskeletal: endorses back pain, denies falls  Hematologic: denies anemia, easy bruising, easy bleeding

## 2020-01-17 NOTE — ED PROVIDER NOTE - OBJECTIVE STATEMENT
Pt is a 23 y/o M w/PMHx Kabuki syndrome on warfarin presents c/o abscess.  Pt was evaluated by his pediatrician and prescribed clindamycin which did not treat the abscess.  He has been afebrile and has no other concerns at this time.

## 2020-01-27 NOTE — HISTORY OF PRESENT ILLNESS
[de-identified] : 24 year old male follow up bilateral hearing loss.  Wearing bilateral hearing aids.  Mother denies complaints of otalgia, otorrhea.

## 2020-01-27 NOTE — PHYSICAL EXAM
[Normal] : mucosa is normal [Midline] : trachea located in midline position [FreeTextEntry1] : poor speech [de-identified] : large amount L>R - cerumen removed AU with curette- AS thick TM retracted otherwise ROGERS - AD -dry perf (central) [de-identified] : higharched

## 2020-01-27 NOTE — REASON FOR VISIT
[Subsequent Evaluation] : a subsequent evaluation for [FreeTextEntry2] : follow up bilateral hearing loss

## 2020-05-18 NOTE — HISTORY OF PRESENT ILLNESS
[T plasty] : tympanoplasty [de-identified] : 24 yr old with bilateral hearing loss returns for ear cleaning-  wears bilateral hearing aid.  Family denies any ear drainage (other than wax) or infection since last visit.  Denies any changes in medical health since last visit.  Patient accompanied by his mother- [employer]  # [id number]

## 2020-05-18 NOTE — PHYSICAL EXAM
[Normal] : lingual tonsils are normal [Midline] : trachea located in midline position [FreeTextEntry1] : poor speech [de-identified] : large amount L>R - cerumen removed AU with curette & alligator & suction- AS thick TM retracted otherwise ROGERS - AD  perf (central) - mild otorrhea 2nd to wax [de-identified] : higharched

## 2020-06-29 PROBLEM — H61.23 BILATERAL IMPACTED CERUMEN: Status: ACTIVE | Noted: 2020-01-01

## 2020-06-29 NOTE — REASON FOR VISIT
[Subsequent Evaluation] : a subsequent evaluation for [[employer] ] : provided by [employer]   [Source: ______] : History obtained from [unfilled] [FreeTextEntry1] : [id number] [FreeTextEntry2] : Maria Fernanda [TWNoteComboBox1] : Indonesian

## 2020-06-29 NOTE — HISTORY OF PRESENT ILLNESS
[de-identified] : 24 year old male follow up bilateral hearing loss, currently wearing bilateral hearing aids.  Mother states has been using the ear drops as prescribed, otorrhea has resolved.  Denies any change in hearing.

## 2020-06-29 NOTE — PHYSICAL EXAM
[Normal] : mucosa is normal [Midline] : trachea located in midline position [FreeTextEntry1] : poor speech [de-identified] : large amount L>R - cerumen removed AU with curette & alligator & suction- AS thick TM retracted otherwise ROGERS - AD  perf (central) - ears dry AU [de-identified] : higharched

## 2020-09-11 PROBLEM — Z87.74 STATUS POST REPAIR OF COARCTATION OF AORTA: Status: ACTIVE | Noted: 2020-01-01

## 2020-09-11 PROBLEM — Z95.2 S/P MITRAL VALVE REPLACEMENT: Status: ACTIVE | Noted: 2020-01-01

## 2020-09-14 PROBLEM — Z79.01 LONG TERM CURRENT USE OF ANTICOAGULANT: Status: ACTIVE | Noted: 2020-01-01

## 2020-09-14 NOTE — HISTORY OF PRESENT ILLNESS
[FreeTextEntry1] : We had the pleasure of seeing Kirk Andrews in the [facility] on September 14, 2020. As you know, he is a 25 year old male with Kabuki syndrome and multiple congenital abnormalities including cleft palate s/p repair, craniosynostosis, anal atresia s/p repair, bilateral hip dysplasia, and congenital heart disease. At age 7 months, he was diagnosed with a coarctation of the aorta after an evaluation for a murmur. He has undergone the following interventions:\par \par 1. 5/5/1998: Balloon angioplasty to coarctation of the aorta, gradient from 80 mmHg to 11 mmHg, Dr. Davis\par 2. 2006: Diagnostic cath revealing suprasystemic RV pressures 2/2 mitral stenosis\par 3.  2/15/2006: Mitral valve replacement with # 25 St Michael's Mechanical mitral valve , Dr. Quigley\par 4.  7/17/2006: EP study with successful ablation of atrial flutter, Dr. Maldonado\par \par Following mitral valve replacement, Kirk had recurrent atrial flutter that was difficult to control with medication. Despite a successful ablation in July of 2006, continued to have breakthrough episodes of atrial flutter. He has since been maintained on beta blockers. According to Dr. Maldonado's note from last year, Kirk's last known recurrence was in June 2016- he was rate controlled with esmolol and then received DCCV the next day.  \par \par Kirk participates in a day program. He denies chest pain, palpitations, shortness of breath, exercise intolerance, near syncope, or syncope.  His mother gives him Coumadin to keep his INR between 2.5 to 3.5. She gives him 3.5 mg Monday thru Friday and 2.5 mg on weekends. He is compliant with his Atenolol 50 mg twice daily.  He denies bleeding issues on Coumadin.

## 2020-09-14 NOTE — REVIEW OF SYSTEMS
[Loss Of Hearing] : hearing loss [Fever] : no fever [Change in Vision] : no change in vision [Wgt Loss (___ Lbs)] : no recent weight loss [Edema] : no edema [Diaphoresis] : not diaphoretic [Cyanosis] : no cyanosis [Exercise Intolerance] : no persistence of exercise intolerance [Chest Pain] : no chest pain or discomfort [Palpitations] : no palpitations [Tachypnea] : not tachypneic [Orthopnea] : no orthopnea [Fast HR] : no tachycardia [Shortness Of Breath] : not expressed as feeling short of breath [Abdominal Pain] : no abdominal pain [Fainting (Syncope)] : no fainting [Easy Bleeding] : no ~M tendency for easy bleeding [Wound problems] : no wound problems [Nosebleeds] : no epistaxis [Dec Urine Output] : no oliguria

## 2020-09-14 NOTE — CONSULT LETTER
[Name] : Name: [unfilled] [Today's Date] : [unfilled] [] : : ~~ [Consult] : I had the pleasure of evaluating your patient, [unfilled]. My full evaluation follows. [Today's Date:] : [unfilled] [Sincerely,] : Sincerely, [Consult - Single Provider] : Thank you very much for allowing me to participate in the care of this patient. If you have any questions, please do not hesitate to contact me. [DrMike  ___] : Dr. DON [FreeTextEntry4] : Domenic Maria [FreeTextEntry5] : [address]  [FreeTextEniwy7] : Phone- [phone] [FreeTextEntry6] : Karla Baumann [id number] [de-identified] : Enid Torres, MSN, CPNP-AC, PC\par Pediatric Cardiology, Adult Congenital Cardiology\par [facility]\par \par Chandler Freed MD\par Pediatric Cardiology\par Adult Congenital Heart Disease\par  of Pediatrics\par [employer] [facility] [employer] [facility] [FreeTextEntry8] : Fax- [phone]

## 2020-09-14 NOTE — REASON FOR VISIT
[Initial Evaluation] : an initial evaluation of [Atrial Flutter] : atrial flutter [Mother] : mother [[employer] ] : provided by [employer]   [FreeTextEntry3] : s/p coarc repair, bicusp AO valve [TWNoteComboBox1] : Welsh [FreeTextEntry2] : Jame [FreeTextEntry1] : [id number]

## 2020-09-14 NOTE — CARDIOLOGY SUMMARY
[Today's Date] : [unfilled] [FreeTextEntry2] : 1. Technically limited imaging secondary to poor acoustic windows.\par 2. Status post surgical mitral valve replacement with [facility] valve; normal disc motion.\par 3. Status post coarctation repair and balloon angioplasty of the descending aorta.\par 4. A mean diastolic flow gradient of 6 mmHg obtained across the mitral valve.\par 5. No mitral valve regurgitation.\par 6. Mild tricuspid valve regurgitation.\par 7. Based on TR gradient, estimated RVp is 25 mmHg plus the right atrial pressure.\par 8. Mild aortic valve regurgitation.\par 9. There is no residual coarctation.\par 10. Normal systolic Doppler profile in the descending aorta at the level of the diaphragm and normal\par systolic and diastolic Doppler profile in the descending aorta.\par 11. Qualitatively normal right ventricular systolic function.\par 12. Qualitatively normal left ventricular systolic function.\par 13. No pericardial effusion. [FreeTextEntry1] : normal sinus rhythm\par left axis deviation\par incomplete right bundle branch\par when compared to prior, no change is seen

## 2020-09-14 NOTE — PHYSICAL EXAM
[General Appearance - Alert] : alert [General Appearance - In No Acute Distress] : in no acute distress [General Appearance - Well Nourished] : well nourished [Sclera] : the sclera were normal [Auscultation Breath Sounds / Voice Sounds] : breath sounds clear to auscultation bilaterally [Respiration, Rhythm And Depth] : normal respiratory rhythm and effort [No Cough] : no cough [Sternotomy] : sternotomy [Apical Impulse] : quiet precordium with normal apical impulse [Heart Rate And Rhythm] : normal heart rate and rhythm [Heart Sounds Pericardial Friction Rub] : no pericardial rub [Heart Sounds Gallop] : no gallops [Heart Sounds Click] : no clicks [Arterial Pulses] : normal upper and lower extremity pulses with no pulse delay [Capillary Refill Test] : normal capillary refill [Edema] : no edema [Abdomen Soft] : soft [FreeTextEntry1] : mechanical s1 normal s2 [] : no hepato-splenomegaly [Nondistended] : nondistended [Musculoskeletal - Swelling] : no joint swelling seen [Abdomen Tenderness] : non-tender [Skin Color & Pigmentation] : normal skin color and pigmentation [Nail Clubbing] : no clubbing  or cyanosis of the fingers [Demonstrated Behavior - Infant Nonreactive To Parents] : interactive

## 2020-09-14 NOTE — DISCUSSION/SUMMARY
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  His echocardiogram demonstrates a durable repair. There is no evidence of repeat coarctation by clinical examination or echocardiogram.  There is limited visualization of his aortic valve morphology but there is no significant stenosis and mild insufficiency.  His mitral valve gradient is similar to prior with a mean gradient of 6 mmHg.  There is no mitral regurgitation.  His estimated pulmonary pressures fall within normal. He has no symptoms suggestive of recurrence of arrhythmia.\par \par We reinforced the need to continue Coumadin with a goal INR of 3.  He needs to adhere to SBE prophylaxis for life.  We placed a Holter monitor today.  He should continue Atenolol as prescribed. We recommended a cardiac MRI to evaluate his aortic arch for any residual coarctation- there is no evidence that he ever had follow up imaging (other than cath) for his aortic arch in our system.  In addition, we will refer him for a cardiac catheterization to rule out the presence of associated cerebral aneurysms, which can be seen in patients with coarctation of the aorta. We also discussed that patients with coarctation (repaired or unrepaired), are at a lifetime risk of developing hypertension.  Additionally, there is an increased risk of cardiovascular events in patients with coarctation. As a result, aggressive lifestyle modification and treatment of reversible risk factors is necessary.  Given the presence of a bicuspid aortic valve, all first degree family members should be screened for BAV.\par \par Follow up in one year, sooner if any concerns arise. [Influenza vaccine is recommended] : Influenza vaccine is recommended [Needs SBE Prophylaxis] : [unfilled]  needs bacterial endocarditis prophylaxis. SBE prophylaxis is indicated for dental and invasive ENT procedures. (Circulation. 2007; 116: 0559-2556) [PE + No Strenuous Varsity Sports] : [unfilled] may participate in the regular physical education program, however, NO VARSITY COMPETITIVE SPORTS where there is strenuous trainng and prolonged physical exertion ( e.g. football, hockey, wrestling, lacrosse, soccer and basketball). Less strenuous sports such as baseball and golf are acceptable at the varsity level.

## 2020-09-28 NOTE — PHYSICAL EXAM
[Normal] : mucosa is normal [Midline] : trachea located in midline position [FreeTextEntry1] : poor speech [de-identified] : ROGERS - AD  small perf (central) - ears dry AU [de-identified] : higharched

## 2020-12-23 PROBLEM — H66.91 ACUTE BACTERIAL INFECTION OF RIGHT MIDDLE EAR: Status: RESOLVED | Noted: 2018-11-05 | Resolved: 2020-01-01

## 2021-03-22 ENCOUNTER — APPOINTMENT (OUTPATIENT)
Dept: OTOLARYNGOLOGY | Facility: CLINIC | Age: 26
End: 2021-03-22

## 2022-08-26 ENCOUNTER — NON-APPOINTMENT (OUTPATIENT)
Age: 27
End: 2022-08-26

## 2023-02-17 NOTE — ED PEDIATRIC NURSE NOTE - NEURO ASSESSMENT
[facility] Terbinafine Counseling: Patient counseling regarding adverse effects of terbinafine including but not limited to headache, diarrhea, rash, upset stomach, liver function test abnormalities, itching, taste/smell disturbance, nausea, abdominal pain, and flatulence.  There is a rare possibility of liver failure that can occur when taking terbinafine.  The patient understands that a baseline LFT and kidney function test may be required. The patient verbalized understanding of the proper use and possible adverse effects of terbinafine.  All of the patient's questions and concerns were addressed.